# Patient Record
Sex: MALE | Race: OTHER | URBAN - METROPOLITAN AREA
[De-identification: names, ages, dates, MRNs, and addresses within clinical notes are randomized per-mention and may not be internally consistent; named-entity substitution may affect disease eponyms.]

---

## 2020-05-21 ENCOUNTER — OFFICE VISIT (OUTPATIENT)
Dept: PRIMARY CARE CLINIC | Age: 40
End: 2020-05-21

## 2020-05-21 VITALS — TEMPERATURE: 97.9 F | HEART RATE: 88 BPM | OXYGEN SATURATION: 98 %

## 2020-05-21 PROCEDURE — 99202 OFFICE O/P NEW SF 15 MIN: CPT | Performed by: NURSE PRACTITIONER

## 2020-05-21 NOTE — PROGRESS NOTES
appearing    [] Depressed appearing  [] Confused appearing      [] Poor short term memory  [] Poor long term memory    [] OTHER:  1}    TESTS ORDERED:    [] POCT FLU  [] POCT STREP  [x] COVID-19 Test sent    TEST RESULTS:    POCT FLU test:  [] Positive  [] Negative  POCT STREP test:  [] Positive  [] Negative  COVID-19 test:  [] Positive  [] Negative    ASSESSMENT:    [] Flu  [] Strep Throat  [] Uncertain Viral Respiratory Infection  [x] Possible COVID-19    PLAN:    [] Discharge home with written instructions for:  [] Flu management  [] Strep throat management  [] Viral respiratory illness management  [x] Possible COVID-19 infection with self-quarantine and management of symptoms  [] Follow-up with primary care physician or emergency department if worsens  [] Referred to emergency department for evaluation      An  electronic signature was used to authenticate this note.      --Kylah Robb on 5/21/2020 at 10:18 AM

## 2020-05-21 NOTE — PATIENT INSTRUCTIONS
Advance Care Planning  People with COVID-19 may have no symptoms, mild symptoms, such as fever, cough, and shortness of breath or they may have more severe illness, developing severe and fatal pneumonia. As a result, Advance Care Planning with attention to naming a health care decision maker (someone you trust to make healthcare decisions for you if you could not speak for yourself) and sharing other health care preferences is important BEFORE a possible health crisis. Please contact your Primary Care Provider to discuss Advance Care Planning. Preventing the Spread of Coronavirus Disease 2019 in Homes and Residential Communities  For the most recent information go to [email or web address]    Prevention steps for People with confirmed or suspected COVID-19 (including persons under investigation) who do not need to be hospitalized  and   People with confirmed COVID-19 who were hospitalized and determined to be medically stable to go home    Your healthcare provider and public health staff will evaluate whether you can be cared for at home. If it is determined that you do not need to be hospitalized and can be isolated at home, you will be monitored by staff from your local or state health department. You should follow the prevention steps below until a healthcare provider or local or state health department says you can return to your normal activities. Stay home except to get medical care  People who are mildly ill with COVID-19 are able to isolate at home during their illness. You should restrict activities outside your home, except for getting medical care. Do not go to work, school, or public areas. Avoid using public transportation, ride-sharing, or taxis. Separate yourself from other people and animals in your home  People: As much as possible, you should stay in a specific room and away from other people in your home.  Also, you should use a separate

## 2020-05-22 LAB
SARS-COV-2: DETECTED
SOURCE: ABNORMAL

## 2020-05-23 NOTE — RESULT ENCOUNTER NOTE
The patient was called for notification of a POSITIVE test result for COVID-19. The following information was given to the patient:    The COVID-19 test result was positive  Treatment of coronavirus does not require an antibiotic    Remain isolated for 10 days minimum or 72 hours after your symptoms have completely resolved, whichever is longer. Wash hands often with soap and water for at least 20 seconds or alternatively use hand  with at least 60% alcohol content    Cover coughs and sneezes    Wear a mask when around others if possible    Clean all \"high-touch\" surfaces every day, such as doorknobs and cellphones    Continually monitor symptoms. Contact a medical provider if symptoms are worsening, such as difficulty breathing. For additional information, please visit the Centers for Disease Control and Prevention at  [email or web address].